# Patient Record
Sex: FEMALE | HISPANIC OR LATINO | URBAN - METROPOLITAN AREA
[De-identification: names, ages, dates, MRNs, and addresses within clinical notes are randomized per-mention and may not be internally consistent; named-entity substitution may affect disease eponyms.]

---

## 2018-11-19 ENCOUNTER — INPATIENT (INPATIENT)
Facility: HOSPITAL | Age: 23
LOS: 8 days | Discharge: HOME | End: 2018-11-28
Attending: PSYCHIATRY & NEUROLOGY | Admitting: PSYCHIATRY & NEUROLOGY
Payer: MEDICAID

## 2018-11-19 VITALS
OXYGEN SATURATION: 100 % | TEMPERATURE: 98 F | RESPIRATION RATE: 18 BRPM | WEIGHT: 149.91 LBS | HEART RATE: 120 BPM | SYSTOLIC BLOOD PRESSURE: 156 MMHG | DIASTOLIC BLOOD PRESSURE: 85 MMHG | HEIGHT: 62 IN

## 2018-11-19 DIAGNOSIS — R45.89 OTHER SYMPTOMS AND SIGNS INVOLVING EMOTIONAL STATE: ICD-10-CM

## 2018-11-19 DIAGNOSIS — F43.23 ADJUSTMENT DISORDER WITH MIXED ANXIETY AND DEPRESSED MOOD: ICD-10-CM

## 2018-11-19 DIAGNOSIS — F32.9 MAJOR DEPRESSIVE DISORDER, SINGLE EPISODE, UNSPECIFIED: ICD-10-CM

## 2018-11-19 LAB
ALBUMIN SERPL ELPH-MCNC: 4.2 G/DL — SIGNIFICANT CHANGE UP (ref 3.3–5)
ALBUMIN SERPL ELPH-MCNC: 4.5 G/DL — SIGNIFICANT CHANGE UP (ref 3.3–5)
ALP SERPL-CCNC: 68 U/L — SIGNIFICANT CHANGE UP (ref 40–120)
ALP SERPL-CCNC: 69 U/L — SIGNIFICANT CHANGE UP (ref 40–120)
ALT FLD-CCNC: 50 U/L — SIGNIFICANT CHANGE UP (ref 12–78)
ALT FLD-CCNC: 52 U/L — SIGNIFICANT CHANGE UP (ref 12–78)
AMPHET UR-MCNC: NEGATIVE — SIGNIFICANT CHANGE UP
ANION GAP SERPL CALC-SCNC: 10 MMOL/L — SIGNIFICANT CHANGE UP (ref 5–17)
ANION GAP SERPL CALC-SCNC: 9 MMOL/L — SIGNIFICANT CHANGE UP (ref 5–17)
APAP SERPL-MCNC: < 2 UG/ML — SIGNIFICANT CHANGE UP (ref 10–30)
APPEARANCE UR: CLEAR — SIGNIFICANT CHANGE UP
AST SERPL-CCNC: 30 U/L — SIGNIFICANT CHANGE UP (ref 15–37)
AST SERPL-CCNC: 57 U/L — HIGH (ref 15–37)
BACTERIA # UR AUTO: ABNORMAL
BARBITURATES UR SCN-MCNC: NEGATIVE — SIGNIFICANT CHANGE UP
BASOPHILS # BLD AUTO: 0.02 K/UL — SIGNIFICANT CHANGE UP (ref 0–0.2)
BASOPHILS NFR BLD AUTO: 0.3 % — SIGNIFICANT CHANGE UP (ref 0–2)
BENZODIAZ UR-MCNC: NEGATIVE — SIGNIFICANT CHANGE UP
BILIRUB DIRECT SERPL-MCNC: <0.1 MG/DL — SIGNIFICANT CHANGE UP (ref 0–0.2)
BILIRUB INDIRECT FLD-MCNC: >0.2 MG/DL — SIGNIFICANT CHANGE UP (ref 0.2–1)
BILIRUB SERPL-MCNC: 0.2 MG/DL — SIGNIFICANT CHANGE UP (ref 0.2–1.2)
BILIRUB SERPL-MCNC: 0.3 MG/DL — SIGNIFICANT CHANGE UP (ref 0.2–1.2)
BILIRUB UR-MCNC: NEGATIVE — SIGNIFICANT CHANGE UP
BUN SERPL-MCNC: 11 MG/DL — SIGNIFICANT CHANGE UP (ref 7–23)
BUN SERPL-MCNC: 12 MG/DL — SIGNIFICANT CHANGE UP (ref 7–23)
CALCIUM SERPL-MCNC: 8.8 MG/DL — SIGNIFICANT CHANGE UP (ref 8.5–10.1)
CALCIUM SERPL-MCNC: 9.1 MG/DL — SIGNIFICANT CHANGE UP (ref 8.5–10.1)
CHLORIDE SERPL-SCNC: 108 MMOL/L — SIGNIFICANT CHANGE UP (ref 96–108)
CHLORIDE SERPL-SCNC: 110 MMOL/L — HIGH (ref 96–108)
CHOLEST SERPL-MCNC: 183 MG/DL — SIGNIFICANT CHANGE UP (ref 10–199)
CO2 SERPL-SCNC: 23 MMOL/L — SIGNIFICANT CHANGE UP (ref 22–31)
CO2 SERPL-SCNC: 24 MMOL/L — SIGNIFICANT CHANGE UP (ref 22–31)
COCAINE METAB.OTHER UR-MCNC: NEGATIVE — SIGNIFICANT CHANGE UP
COLOR SPEC: YELLOW — SIGNIFICANT CHANGE UP
COMMENT - URINE: SIGNIFICANT CHANGE UP
CREAT SERPL-MCNC: 0.81 MG/DL — SIGNIFICANT CHANGE UP (ref 0.5–1.3)
CREAT SERPL-MCNC: 0.84 MG/DL — SIGNIFICANT CHANGE UP (ref 0.5–1.3)
DIFF PNL FLD: NEGATIVE — SIGNIFICANT CHANGE UP
EOSINOPHIL # BLD AUTO: 0.15 K/UL — SIGNIFICANT CHANGE UP (ref 0–0.5)
EOSINOPHIL NFR BLD AUTO: 2.1 % — SIGNIFICANT CHANGE UP (ref 0–6)
EPI CELLS # UR: SIGNIFICANT CHANGE UP
ETHANOL SERPL-MCNC: 278 MG/DL — HIGH (ref 0–10)
GLUCOSE SERPL-MCNC: 112 MG/DL — HIGH (ref 70–99)
GLUCOSE SERPL-MCNC: 92 MG/DL — SIGNIFICANT CHANGE UP (ref 70–99)
GLUCOSE UR QL: NEGATIVE MG/DL — SIGNIFICANT CHANGE UP
HBA1C BLD-MCNC: 5.7 % — HIGH (ref 4–5.6)
HCT VFR BLD CALC: 43.4 % — SIGNIFICANT CHANGE UP (ref 34.5–45)
HDLC SERPL-MCNC: 54 MG/DL — SIGNIFICANT CHANGE UP
HGB BLD-MCNC: 14.3 G/DL — SIGNIFICANT CHANGE UP (ref 11.5–15.5)
IMM GRANULOCYTES NFR BLD AUTO: 0.3 % — SIGNIFICANT CHANGE UP (ref 0–1.5)
KETONES UR-MCNC: NEGATIVE — SIGNIFICANT CHANGE UP
LEUKOCYTE ESTERASE UR-ACNC: NEGATIVE — SIGNIFICANT CHANGE UP
LIPID PNL WITH DIRECT LDL SERPL: 101 MG/DL — SIGNIFICANT CHANGE UP
LYMPHOCYTES # BLD AUTO: 3.27 K/UL — SIGNIFICANT CHANGE UP (ref 1–3.3)
LYMPHOCYTES # BLD AUTO: 46.7 % — HIGH (ref 13–44)
MAGNESIUM SERPL-MCNC: 2.2 MG/DL — SIGNIFICANT CHANGE UP (ref 1.6–2.6)
MCHC RBC-ENTMCNC: 29.9 PG — SIGNIFICANT CHANGE UP (ref 27–34)
MCHC RBC-ENTMCNC: 32.9 GM/DL — SIGNIFICANT CHANGE UP (ref 32–36)
MCV RBC AUTO: 90.6 FL — SIGNIFICANT CHANGE UP (ref 80–100)
METHADONE UR-MCNC: NEGATIVE — SIGNIFICANT CHANGE UP
MONOCYTES # BLD AUTO: 0.39 K/UL — SIGNIFICANT CHANGE UP (ref 0–0.9)
MONOCYTES NFR BLD AUTO: 5.6 % — SIGNIFICANT CHANGE UP (ref 2–14)
NEUTROPHILS # BLD AUTO: 3.15 K/UL — SIGNIFICANT CHANGE UP (ref 1.8–7.4)
NEUTROPHILS NFR BLD AUTO: 45 % — SIGNIFICANT CHANGE UP (ref 43–77)
NITRITE UR-MCNC: NEGATIVE — SIGNIFICANT CHANGE UP
NRBC # BLD: 0 /100 WBCS — SIGNIFICANT CHANGE UP (ref 0–0)
OPIATES UR-MCNC: NEGATIVE — SIGNIFICANT CHANGE UP
PCP SPEC-MCNC: SIGNIFICANT CHANGE UP
PCP UR-MCNC: NEGATIVE — SIGNIFICANT CHANGE UP
PH UR: 6 — SIGNIFICANT CHANGE UP (ref 5–8)
PHOSPHATE SERPL-MCNC: 3.1 MG/DL — SIGNIFICANT CHANGE UP (ref 2.5–4.5)
PLATELET # BLD AUTO: 360 K/UL — SIGNIFICANT CHANGE UP (ref 150–400)
POTASSIUM SERPL-MCNC: 3.9 MMOL/L — SIGNIFICANT CHANGE UP (ref 3.5–5.3)
POTASSIUM SERPL-MCNC: 4.8 MMOL/L — SIGNIFICANT CHANGE UP (ref 3.5–5.3)
POTASSIUM SERPL-SCNC: 3.9 MMOL/L — SIGNIFICANT CHANGE UP (ref 3.5–5.3)
POTASSIUM SERPL-SCNC: 4.8 MMOL/L — SIGNIFICANT CHANGE UP (ref 3.5–5.3)
PROT SERPL-MCNC: 8.6 GM/DL — HIGH (ref 6–8.3)
PROT SERPL-MCNC: 8.8 GM/DL — HIGH (ref 6–8.3)
PROT UR-MCNC: 30 MG/DL
RBC # BLD: 4.79 M/UL — SIGNIFICANT CHANGE UP (ref 3.8–5.2)
RBC # FLD: 13.1 % — SIGNIFICANT CHANGE UP (ref 10.3–14.5)
RBC CASTS # UR COMP ASSIST: NEGATIVE /HPF — SIGNIFICANT CHANGE UP (ref 0–4)
SALICYLATES SERPL-MCNC: <1.7 MG/DL — LOW (ref 2.8–20)
SODIUM SERPL-SCNC: 140 MMOL/L — SIGNIFICANT CHANGE UP (ref 135–145)
SODIUM SERPL-SCNC: 144 MMOL/L — SIGNIFICANT CHANGE UP (ref 135–145)
SP GR SPEC: 1 — LOW (ref 1.01–1.02)
THC UR QL: NEGATIVE — SIGNIFICANT CHANGE UP
TOTAL CHOLESTEROL/HDL RATIO MEASUREMENT: 3.4 RATIO — SIGNIFICANT CHANGE UP (ref 3.3–7.1)
TRIGL SERPL-MCNC: 139 MG/DL — SIGNIFICANT CHANGE UP (ref 10–149)
TSH SERPL-MCNC: 1.04 UU/ML — SIGNIFICANT CHANGE UP (ref 0.34–4.82)
UROBILINOGEN FLD QL: NEGATIVE MG/DL — SIGNIFICANT CHANGE UP
WBC # BLD: 7 K/UL — SIGNIFICANT CHANGE UP (ref 3.8–10.5)
WBC # FLD AUTO: 7 K/UL — SIGNIFICANT CHANGE UP (ref 3.8–10.5)
WBC UR QL: NEGATIVE — SIGNIFICANT CHANGE UP

## 2018-11-19 PROCEDURE — 99285 EMERGENCY DEPT VISIT HI MDM: CPT

## 2018-11-19 PROCEDURE — 90792 PSYCH DIAG EVAL W/MED SRVCS: CPT

## 2018-11-19 PROCEDURE — 93010 ELECTROCARDIOGRAM REPORT: CPT

## 2018-11-19 RX ORDER — HALOPERIDOL DECANOATE 100 MG/ML
5 INJECTION INTRAMUSCULAR ONCE
Qty: 0 | Refills: 0 | Status: COMPLETED | OUTPATIENT
Start: 2018-11-19 | End: 2018-11-19

## 2018-11-19 RX ORDER — DIPHENHYDRAMINE HCL 50 MG
50 CAPSULE ORAL ONCE
Qty: 0 | Refills: 0 | Status: COMPLETED | OUTPATIENT
Start: 2018-11-19 | End: 2018-11-19

## 2018-11-19 RX ORDER — HALOPERIDOL DECANOATE 100 MG/ML
5 INJECTION INTRAMUSCULAR EVERY 6 HOURS
Qty: 0 | Refills: 0 | Status: DISCONTINUED | OUTPATIENT
Start: 2018-11-19 | End: 2018-11-28

## 2018-11-19 RX ORDER — ESCITALOPRAM OXALATE 10 MG/1
5 TABLET, FILM COATED ORAL DAILY
Qty: 0 | Refills: 0 | Status: DISCONTINUED | OUTPATIENT
Start: 2018-11-19 | End: 2018-11-20

## 2018-11-19 RX ORDER — DIPHENHYDRAMINE HCL 50 MG
50 CAPSULE ORAL EVERY 6 HOURS
Qty: 0 | Refills: 0 | Status: DISCONTINUED | OUTPATIENT
Start: 2018-11-19 | End: 2018-11-28

## 2018-11-19 RX ADMIN — Medication 50 MILLIGRAM(S): at 01:55

## 2018-11-19 RX ADMIN — Medication 2 MILLIGRAM(S): at 01:55

## 2018-11-19 RX ADMIN — HALOPERIDOL DECANOATE 5 MILLIGRAM(S): 100 INJECTION INTRAMUSCULAR at 01:55

## 2018-11-19 NOTE — H&P ADULT - NSHPPHYSICALEXAM_GEN_ALL_CORE
ICU Vital Signs Last 24 Hrs    T(F): 98.4 (19 Nov 2018 20:41), Max: 98.9 (19 Nov 2018 15:38)  HR: 117 (19 Nov 2018 20:41) (87 - 120)  BP: 103/65 (19 Nov 2018 20:41) (103/65 - 156/85)    RR: 16 (19 Nov 2018 20:41) (16 - 18)  SpO2: 100% (19 Nov 2018 20:41) (99% - 100%)

## 2018-11-19 NOTE — ED ADULT TRIAGE NOTE - CHIEF COMPLAINT QUOTE
As per EMS, patient was found on the side on the side of the [facility] threatening to run in front of traffic. EMS states she drank 2 beers but patient denies. Arrived in handcuffs by [facility] Police #[id number] , EMS states patient became agitated and was handcuffed to stretcher by PD. Patient cooperative on arrival to ED, stating EMS personnel are her "boyfriends", and that triage RN is "very cute" No distress noted at this time. When attempting to place patient in hospital stretcher, patient became extremely agitated, yelling and aggressive with EMS and ED staff. Multiple RNs and MD Osei called to bedside.

## 2018-11-19 NOTE — ED ADULT NURSE NOTE - OBJECTIVE STATEMENT
· HPI Objective Statement: Pt. is a 24 yo F with unknown medical history BIB police from [facility] where she was found trying to run into the road being restrained by a male friend who states he was the designated  to bring her home.  Pt. told police that male was her boyfriend but male at scene denied being in a relationship with the patient.  Police witnessed patient say "my family my family, I am going to kill myself."  Police called EMS who arrived but patient was combative and refused to get into the ambulance.  On arrival, pt. admits to drinking 2 beers last night and denies any other drug use, but cannot give a history of why she was on the highway, who she was with, or anything about the events that led her to be brought to the hospital.  Pt. handcuffed on arrival, but aggressive and trying to get out of handcuffs and yelling at police calling them liars.  She had inappropriate laughing at times.  Pt. currently denies trying to run into the road and when questioned about why she was on the highway in the middle of the night she was laughing and had no answers.

## 2018-11-19 NOTE — PATIENT PROFILE BEHAVIORAL HEALTH - FUNCTIONAL LEVEL PRIOR: TOILETING
Psychotherapy Provided: Individual Psychotherapy 45 minutes     Length of time in session: 45 minutes, follow up in 1 month    Goals addressed in session: Goal 1     Pain:      none    0    Current suicide risk : Low     D- Linda presented as upset and tearful  She stated that she has been struggling to decide whether or not to have her boyfriend permanently move in  She stated that she worries about her family's opinions despite their treatment of her  She also stated that she worries about his ex-wife's behavior as well as having a role with his children  She also shared that she has made many mistakes in her life and is worried about doing so again  Processing her emotions and discussing ways for her to be endy to make a decision regarding her relationship  Also discussing continuing to set boundaries in her relationship with her family  Reviewing and signing her treatment plan  Giving supportive therapy  A- Progress - Continuing to process her emotions  P-Continue treatment    [address]: Diagnosis and Treatment Plan explained to Chayo Franklin relates understanding diagnosis and is agreeable to Treatment Plan   Yes 0 = independent

## 2018-11-19 NOTE — ED BEHAVIORAL HEALTH ASSESSMENT NOTE - RISK ASSESSMENT
Pt is s/p SA , at risk to harm self. She is impulsive, irritable, using EtOH, por coping skills, SI. impulsive. She is future oriented, but we have no collaterals, and pt is not cooperating.

## 2018-11-19 NOTE — ED PROVIDER NOTE - PSYCHIATRIC, MLM
Alert and oriented to person and type of place. crying, laughing, appears intoxicated. Unable to cooperate for exam.

## 2018-11-19 NOTE — H&P ADULT - HISTORY OF PRESENT ILLNESS
Pt. is a 22 yo F with unknown medical history BIB police from [facility] where she was found trying to run into the road being restrained by a male friend who states he was the designated  to bring her home.  Pt. told police that male was her boyfriend but male at scene denied being in a relationship with the patient.  Police witnessed patient say "my family my family, I am going to kill myself."  Police called EMS who arrived but patient was combative and refused to get into the ambulance.  On arrival, pt. admits to drinking 2 beers last night and denies any other drug use, but cannot give a history of why she was on the highway, who she was with, or anything about the events that led her to be brought to the hospital.  Pt. handcuffed on arrival, but aggressive and trying to get out of handcuffs and yelling at police calling them liars.  She had inappropriate laughing at times.  Pt. currently denies trying to run into the road and when questioned about why she was on the highway in the middle of the night she was laughing and had no answers Pt is a 22 yo F with PMed/Surg hx appendectomy who presented to the ED with police from [facility] as she was trying to run into the road and being restrained by a male friend who stated he was the designated  to bring her home.  Pt. told police that the male was her boyfriend but the male denied being in a relationship with the patient.  Police reported  patient said "my family my family, I am going to kill myself."   EMS was unable to bring pt as she  was combative and refused to get into the ambulance.  She was brought  handcuffed on arrival, but continued to be combative and was trying to get out of the handcuffs .       In the ED, pt. reported drinking 2 beers last night and  couldn't give a history of the recent events on  the highway.  Pt. denied trying to run into the road and when questioned about why she was on the highway in the middle of the night, she was laughing and would not reply.    Past Med/Surg Hx:  appendectomy    Fam Hx:  Denies known medical problems  Mother alive at 52  Father alive at 45

## 2018-11-19 NOTE — H&P ADULT - ASSESSMENT
Pt is admitted w/    I. Depression, Suicidal Ideation  - cont inpatient behavioral health treatment plan    II. Alcohol abuse  - drinking cessation    III. DVT prophylaxis  - ambulation    IV. IMPROVE VTE Individual Risk Assessment    RISK                                                                Points    [  ] Previous VTE                                                  3    [  ] Thrombophilia                                               2    [  ] Lower limb paralysis                                      2        (unable to hold up >15 seconds)      [  ] Current Cancer                                              2         (within 6 months)    [  ] Immobilization > 24 hrs                                1    [  ] ICU/CCU stay > 24 hours                              1    [  ] Age > 60                                                      1    IMPROVE VTE Score ___0______ Pt is a 22 yo F with PMed/Surg hx appendectomy who presented to the ED with police from [facility] as she was trying to run into the road and being restrained by a male friend who stated he was the designated  to bring her home.  Pt. told police that the male was her boyfriend but the male denied being in a relationship with the patient.  Police reported  patient said "my family my family, I am going to kill myself."   EMS was unable to bring pt as she  was combative and refused to get into the ambulance.  She was brought  handcuffed on arrival, but continued to be combative and was trying to get out of the handcuffs .       In the ED, pt. reported drinking 2 beers last night and  coundn't give a history of the recent events on  the highway.  Pt. denied trying to run into the road and when questioned about why she was on the highway in the middle of the night, she was laughing and would not reply.    Pt is admitted w/    I. Depression, Suicidal Ideation  - cont inpatient behavioral health treatment plan    II. Alcohol abuse  - drinking cessation    III. DVT prophylaxis  - ambulation    IV. IMPROVE VTE Individual Risk Assessment    RISK                                                                Points    [  ] Previous VTE                                                  3    [  ] Thrombophilia                                               2    [  ] Lower limb paralysis                                      2        (unable to hold up >15 seconds)      [  ] Current Cancer                                              2         (within 6 months)    [  ] Immobilization > 24 hrs                                1    [  ] ICU/CCU stay > 24 hours                              1    [  ] Age > 60                                                      1    IMPROVE VTE Score ___0______

## 2018-11-19 NOTE — ED PROVIDER NOTE - OBJECTIVE STATEMENT
Pt. is a 22 yo F with unknown medical history BIB police from [facility] where she was found trying to run into the road being restrained by a male friend who states he was the designated  to bring her home.  Pt. told police that male was her boyfriend but male at scene denied being in a relationship with the patient.  Police witnessed patient say "my family my family, I am going to kill myself."  Police called EMS who arrived but patient was combative and refused to get into the ambulance.  On arrival, pt. admits to drinking 2 beers last night and denies any other drug use, but cannot give a history of why she was on the highway, who she was with, or anything about the events that led her to be brought to the hospital.  Pt. handcuffed on arrival, but aggressive and trying to get out of handcuffs and yelling at police calling them liars.  She had inappropriate laughing at times.  Pt. currently denies trying to run into the road and when questioned about why she was on the highway in the middle of the night she was laughing and had no answers.

## 2018-11-19 NOTE — ED PROVIDER NOTE - CONSTITUTIONAL, MLM
normal... Well appearing, well nourished, awake, alert, oriented to person and type of place only.  Crying and inappropriately laughing but poor historian. Appears intoxicated.

## 2018-11-19 NOTE — ED ADULT NURSE NOTE - NSIMPLEMENTINTERV_GEN_ALL_ED
Implemented All Fall Risk Interventions:  [place] to call system. Call bell, personal items and telephone within reach. Instruct patient to call for assistance. Room bathroom lighting operational. Non-slip footwear when patient is off stretcher. Physically safe environment: no spills, clutter or unnecessary equipment. Stretcher in lowest position, wheels locked, appropriate side rails in place. Provide visual cue, wrist band, yellow gown, etc. Monitor gait and stability. Monitor for mental status changes and reorient to person, place, and time. Review medications for side effects contributing to fall risk. Reinforce activity limits and safety measures with patient and family.

## 2018-11-19 NOTE — ED BEHAVIORAL HEALTH ASSESSMENT NOTE - HPI (INCLUDE ILLNESS QUALITY, SEVERITY, DURATION, TIMING, CONTEXT, MODIFYING FACTORS, ASSOCIATED SIGNS AND SYMPTOMS)
23YOHW with no known psychiatric hx, presents in ER after she attempted suicide by walking into the traffic on [facility].   reportedly, pt was help by male partner in order not to ru into the traffic when police arrived. PT stated to police that she wanted to kilo herself and today she is repeating the same.   She state states that she wanted to kill herself, but when asked to explain the stressors and what happened, she states that she palmer not remember. On admission EtOH level was 278.   Today pt states that she does not feel suicidal. However, she doest not want us to contact any collaterals and she states that she palmer snot remember what happened. She endorses depressed mood and sleep [problems, Denies AH and VH.   She continue to be at high imminent risk to suicide and she need inpatient level of care,.

## 2018-11-19 NOTE — H&P ADULT - NSHPSOCIALHISTORY_GEN_ALL_CORE
No tob/ETOH/drug use. Pt reports she lives with a friend.  She repors 3 cig on the weekend and 6-8 beers on the weekend.   Denies drug use.   She grew up in [place] and her parents live in [place].

## 2018-11-19 NOTE — ED ADULT NURSE NOTE - CHIEF COMPLAINT QUOTE
As per EMS, patient was found on the side on the side of the [facility] threatening to run in front of traffic. EMS states she drank 2 beers but patient denies. Arrived in handcuffs by [facility] Police #[id number] , EMS states patient became agitated and was handcuffed to stretcher by PD. Patient cooperative on arrival to ED, stating EMS personnel are her "boyfriends", and that triage RN is "very cute" No distress noted at this time. When attempting to place patient in hospital stretcher, patient became extremely agitated, yelling and aggressive with EMS and ED staff. Multiple RNs and MD Osei called to bedside.

## 2018-11-19 NOTE — ED PROVIDER NOTE - MEDICAL DECISION MAKING DETAILS
Alcohol intoxication with state  who witnessed suicidal statement and attempts to run into the road and friends holding her back. Will get labs, urine, and will have psych eval in AM.

## 2018-11-19 NOTE — ED BEHAVIORAL HEALTH ASSESSMENT NOTE - SUMMARY
23 YOHW with no formal psych hx, presents in ER s/op suicide attempt. She is at imminent risk to harm self and she needs inpatient level of care.   Admit 9.39 to 5N.

## 2018-11-20 DIAGNOSIS — F10.929 ALCOHOL USE, UNSPECIFIED WITH INTOXICATION, UNSPECIFIED: ICD-10-CM

## 2018-11-20 LAB
ANION GAP SERPL CALC-SCNC: 8 MMOL/L — SIGNIFICANT CHANGE UP (ref 5–17)
BUN SERPL-MCNC: 15 MG/DL — SIGNIFICANT CHANGE UP (ref 7–23)
CALCIUM SERPL-MCNC: 9.2 MG/DL — SIGNIFICANT CHANGE UP (ref 8.5–10.1)
CHLORIDE SERPL-SCNC: 104 MMOL/L — SIGNIFICANT CHANGE UP (ref 96–108)
CO2 SERPL-SCNC: 29 MMOL/L — SIGNIFICANT CHANGE UP (ref 22–31)
CREAT SERPL-MCNC: 0.86 MG/DL — SIGNIFICANT CHANGE UP (ref 0.5–1.3)
GLUCOSE SERPL-MCNC: 89 MG/DL — SIGNIFICANT CHANGE UP (ref 70–99)
MAGNESIUM SERPL-MCNC: 2.2 MG/DL — SIGNIFICANT CHANGE UP (ref 1.6–2.6)
PHOSPHATE SERPL-MCNC: 3.4 MG/DL — SIGNIFICANT CHANGE UP (ref 2.5–4.5)
POTASSIUM SERPL-MCNC: 3.8 MMOL/L — SIGNIFICANT CHANGE UP (ref 3.5–5.3)
POTASSIUM SERPL-SCNC: 3.8 MMOL/L — SIGNIFICANT CHANGE UP (ref 3.5–5.3)
SODIUM SERPL-SCNC: 141 MMOL/L — SIGNIFICANT CHANGE UP (ref 135–145)

## 2018-11-20 PROCEDURE — 99232 SBSQ HOSP IP/OBS MODERATE 35: CPT

## 2018-11-20 PROCEDURE — 93010 ELECTROCARDIOGRAM REPORT: CPT

## 2018-11-20 RX ORDER — ESCITALOPRAM OXALATE 10 MG/1
10 TABLET, FILM COATED ORAL DAILY
Qty: 0 | Refills: 0 | Status: DISCONTINUED | OUTPATIENT
Start: 2018-11-20 | End: 2018-11-28

## 2018-11-20 RX ORDER — ESCITALOPRAM OXALATE 10 MG/1
5 TABLET, FILM COATED ORAL DAILY
Qty: 0 | Refills: 0 | Status: DISCONTINUED | OUTPATIENT
Start: 2018-11-20 | End: 2018-11-20

## 2018-11-21 PROCEDURE — 99232 SBSQ HOSP IP/OBS MODERATE 35: CPT

## 2018-11-21 RX ADMIN — ESCITALOPRAM OXALATE 10 MILLIGRAM(S): 10 TABLET, FILM COATED ORAL at 09:08

## 2018-11-22 PROCEDURE — 99232 SBSQ HOSP IP/OBS MODERATE 35: CPT

## 2018-11-22 RX ORDER — TRAZODONE HCL 50 MG
50 TABLET ORAL AT BEDTIME
Qty: 0 | Refills: 0 | Status: DISCONTINUED | OUTPATIENT
Start: 2018-11-22 | End: 2018-11-28

## 2018-11-22 RX ADMIN — ESCITALOPRAM OXALATE 10 MILLIGRAM(S): 10 TABLET, FILM COATED ORAL at 10:15

## 2018-11-22 RX ADMIN — Medication 50 MILLIGRAM(S): at 20:46

## 2018-11-23 PROCEDURE — 99232 SBSQ HOSP IP/OBS MODERATE 35: CPT

## 2018-11-23 RX ADMIN — ESCITALOPRAM OXALATE 10 MILLIGRAM(S): 10 TABLET, FILM COATED ORAL at 09:35

## 2018-11-23 RX ADMIN — Medication 50 MILLIGRAM(S): at 21:52

## 2018-11-24 RX ADMIN — Medication 50 MILLIGRAM(S): at 21:13

## 2018-11-24 RX ADMIN — ESCITALOPRAM OXALATE 10 MILLIGRAM(S): 10 TABLET, FILM COATED ORAL at 09:24

## 2018-11-24 NOTE — PROGRESS NOTE BEHAVIORAL HEALTH - NSBHADMITMEDEDUDETAILS_A_CORE FT
pt is aware of the use of medication and of potential side effects

## 2018-11-24 NOTE — PROGRESS NOTE BEHAVIORAL HEALTH - PROBLEM SELECTOR PROBLEM 4
Alcoholic intoxication with complication

## 2018-11-24 NOTE — PROGRESS NOTE BEHAVIORAL HEALTH - PROBLEM SELECTOR PLAN 3
pt to attend groups for insight and coping skills

## 2018-11-24 NOTE — PROGRESS NOTE BEHAVIORAL HEALTH - PROBLEM SELECTOR PLAN 4
AA, therapy to address maladaptive coping/alcohol use  D/c vaibhav substance abuse tx
AA, therapy to address maladaptive coping/alcohol use  D/c vaibhav substance abuse tx
AA, therapy to address maladaptive coping/alcohol use
AA, therapy to address maladaptive coping/alcohol use  D/c vaibhav substance abuse tx
AA, therapy to address maladaptive coping/alcohol use  D/c vaibhav substance abuse tx

## 2018-11-24 NOTE — PROGRESS NOTE BEHAVIORAL HEALTH - NSBHCHARTREVIEWINVESTIGATE_PSY_A_CORE FT
Ventricular Rate 81 BPM    Atrial Rate 81 BPM    P-R Interval 152 ms    QRS Duration 84 ms    Q-T Interval 372 ms    QTC Calculation(Bezet) 432 ms    P Axis 40 degrees    R Axis 75 degrees    T Axis 33 degrees    Diagnosis Line Normal sinus rhythm  Normal ECG  When compared with ECG of 19-NOV-2018 01:58,  QT has shortened  Confirmed by Edmund Seals ([id number])
completed but not entered in EMR  Pregnancy done but not seen in EMR
Ventricular Rate 81 BPM    Atrial Rate 81 BPM    P-R Interval 152 ms    QRS Duration 84 ms    Q-T Interval 372 ms    QTC Calculation(Bezet) 432 ms    P Axis 40 degrees    R Axis 75 degrees    T Axis 33 degrees    Diagnosis Line Normal sinus rhythm  Normal ECG  When compared with ECG of 19-NOV-2018 01:58,  QT has shortened  Confirmed by Edmund Seals ([id number])
QRS axis to [] ° and NSR at a rate of [] BPM. There was no atrial enlargement. There was no ventricular hypertrophy. There were no ST-T changes and all intervals were normal.

## 2018-11-24 NOTE — PROGRESS NOTE BEHAVIORAL HEALTH - SECONDARY DX1
Adjustment disorder with mixed anxiety and depressed mood
Adjustment disorder with mixed anxiety and depressed mood
Suicidal risk

## 2018-11-24 NOTE — PROGRESS NOTE BEHAVIORAL HEALTH - PROBLEM SELECTOR PROBLEM 2
Adjustment disorder with mixed anxiety and depressed mood

## 2018-11-24 NOTE — PROGRESS NOTE BEHAVIORAL HEALTH - SECONDARY DX2
Depressive disorder
Depressive disorder
Alcoholic intoxication with complication

## 2018-11-24 NOTE — PROGRESS NOTE BEHAVIORAL HEALTH - PROBLEM SELECTOR PLAN 2
lexapro for mood changes

## 2018-11-25 LAB
APPEARANCE UR: CLEAR — SIGNIFICANT CHANGE UP
BILIRUB UR-MCNC: NEGATIVE — SIGNIFICANT CHANGE UP
COLOR SPEC: YELLOW — SIGNIFICANT CHANGE UP
DIFF PNL FLD: NEGATIVE — SIGNIFICANT CHANGE UP
GLUCOSE UR QL: NEGATIVE MG/DL — SIGNIFICANT CHANGE UP
KETONES UR-MCNC: NEGATIVE — SIGNIFICANT CHANGE UP
LEUKOCYTE ESTERASE UR-ACNC: NEGATIVE — SIGNIFICANT CHANGE UP
NITRITE UR-MCNC: NEGATIVE — SIGNIFICANT CHANGE UP
PH UR: 7 — SIGNIFICANT CHANGE UP (ref 5–8)
PROT UR-MCNC: NEGATIVE MG/DL — SIGNIFICANT CHANGE UP
SP GR SPEC: 1 — LOW (ref 1.01–1.02)
UROBILINOGEN FLD QL: NEGATIVE MG/DL — SIGNIFICANT CHANGE UP

## 2018-11-25 RX ADMIN — Medication 50 MILLIGRAM(S): at 21:35

## 2018-11-25 RX ADMIN — ESCITALOPRAM OXALATE 10 MILLIGRAM(S): 10 TABLET, FILM COATED ORAL at 09:11

## 2018-11-26 PROCEDURE — 99231 SBSQ HOSP IP/OBS SF/LOW 25: CPT

## 2018-11-26 RX ORDER — ESCITALOPRAM OXALATE 10 MG/1
1 TABLET, FILM COATED ORAL
Qty: 15 | Refills: 0 | OUTPATIENT
Start: 2018-11-26 | End: 2018-12-10

## 2018-11-26 RX ORDER — TRAZODONE HCL 50 MG
1 TABLET ORAL
Qty: 15 | Refills: 0 | OUTPATIENT
Start: 2018-11-26 | End: 2018-12-10

## 2018-11-26 RX ADMIN — ESCITALOPRAM OXALATE 10 MILLIGRAM(S): 10 TABLET, FILM COATED ORAL at 09:45

## 2018-11-26 RX ADMIN — Medication 50 MILLIGRAM(S): at 21:19

## 2018-11-26 NOTE — DISCHARGE NOTE BEHAVIORAL HEALTH - NSBHDCSUICFCTRSFT_PSY_A_CORE
Alcohol use; communication with parents; initiation and compliance with treatment / follow-up; engaging employer to secure medical care financial support / access to healthcare

## 2018-11-26 NOTE — DISCHARGE NOTE BEHAVIORAL HEALTH - NSBHDCSUICPROTECTFT_PSY_A_CORE
no suicidal ideation/intent/plan, no homicidal ideation/intent/plan, no actual suicide attempt; positive response to medication management; improved insight and judgment; motivated for out-patient treatment; medication compliance; future oriented; engaged in safety planning

## 2018-11-26 NOTE — DISCHARGE NOTE BEHAVIORAL HEALTH - CONDITIONS AT DISCHARGE
Patient alert, oriented x3, pleasant and cooperative.  Pt denies S/H IIP currently.  Nurse and  reviewed discharge plan with patient who agrees and accepts plan.  Pt provided with a copy of discharge paperwork.  Pt appropriately dressed for discharge and is transported home by family to ensure safe arrival home. Patient alert, oriented x3, pleasant and cooperative.  Pt denies S/H IIP currently.  Nurse and  reviewed discharge plan with patient who agrees and accepts plan.  Pt provided with a copy of discharge paperwork.  Pt appropriately dressed for discharge and is transported home by family to ensure safe arrival home. Seven day supply of prescribed medications to be provided by  Pharmacy, additional RX sent to patient requested pharmacy; [facility] ([phone] located at [address] [place] [address]).

## 2018-11-26 NOTE — DISCHARGE NOTE BEHAVIORAL HEALTH - MEDICATION SUMMARY - MEDICATIONS TO TAKE
I will START or STAY ON the medications listed below when I get home from the hospital:    Lexapro 10 mg oral tablet  -- 1 tab(s) by mouth once a day  -- Indication: For Depressive disorder    traZODone 50 mg oral tablet  -- 1 tab(s) by mouth once a day (at bedtime)  -- Indication: For Depressive disorder

## 2018-11-26 NOTE — DISCHARGE NOTE BEHAVIORAL HEALTH - HPI (INCLUDE ILLNESS QUALITY, SEVERITY, DURATION, TIMING, CONTEXT, MODIFYING FACTORS, ASSOCIATED SIGNS AND SYMPTOMS)
23YOHW with no known psychiatric history, presented in ER after she attempted suicide by walking into the traffic on [address].     Reportedly, patient was helped by male partner in order not to run into the traffic when police arrived. Patient stated to police that she wanted to kill herself and repeated the same during ED course.  She state states that she wanted to kill herself, but when asked to explain the stressors and what happened, she states that she palmer not remember. On admission EtOH level was 278.   Later after sobriety, patient retracted suicidal ideation, however given refusal of providing collateral, along with depressive symptoms with high risk suicidal behaviors, patient was an imminent risk and was admitted psychiatrically.

## 2018-11-26 NOTE — DISCHARGE NOTE BEHAVIORAL HEALTH - NSBHDCSUICSAFETYFT_PSY_A_CORE
Patient instructed to return to the ED or call 911 if patient experiences SI, HI, hopelessness, worsening of symptoms or has any other concerns. Patient instructed to return to the ED or call 911 if patient experiences SI, HI, hopelessness, worsening of symptoms or has any other concerns.  Patient encouraged to reach out to family or employer to report any worsening symptoms.  Pt to use coping skills she has learned to deal with symptoms. Advised to return to hospital or go to nearest ED or call 911 or (755) LIFENET or [phone] 273 TALK hotlines for any severe, worsening or persistent symptoms including suicidal/homicidal ideations, intent or plans. Patient verbalized understanding of instructions.

## 2018-11-26 NOTE — DISCHARGE NOTE BEHAVIORAL HEALTH - FAMILY HISTORY OF PSYCHIATRIC ILLNESS
Patient is an immigrant; works and lives in house where she is employed as [employer]; parents live in [place] of which they have had strained relationship however improved since in-patient hospitalization; patient motivated to continue her education and learn English.

## 2018-11-26 NOTE — DISCHARGE NOTE BEHAVIORAL HEALTH - NSBHDCMEDSFT_PSY_A_CORE
Lexapro 10 mg daily - patient saw improvement of depressive symptoms, with less persistent sadness, no hopelessness, no helplessness, improved energy and motivation and no suicidal ideation/intent/plan. Patient experiencing improved insight and judgment.    Trazodone 50 mg at bedtime - patient had improved overall mood symptoms, more specifically sleep hygiene. Lexapro 10 mg daily - patient saw improvement of depressive symptoms, with less persistent sadness, no hopelessness, no helplessness, improved energy and motivation and no suicidal ideation/intent/plan. Patient experiencing improved insight and judgment.    Trazodone 50 mg at bedtime - patient had improved overall mood symptoms, more specifically sleep hygiene.    Patient educated to not stop any medication unless otherwise told to do so by outpatient provider

## 2018-11-26 NOTE — DISCHARGE NOTE BEHAVIORAL HEALTH - NSBHDCCONDITIONFT_PSY_A_CORE
Patient alert, oriented x3, pleasant and cooperative.  Pt denies any thoughts to harm self or others

## 2018-11-26 NOTE — DISCHARGE NOTE BEHAVIORAL HEALTH - NSBHDCDXVALIDYESFT_PSY_A_CORE
Patient admitted for depressive disorder, of which influenced suicidal behaviors. Additionally patient aware of alcohol's role in influencing her depressive symptoms. Patient treated with antidepressant, which showed clinical improvement, as patient experiencing a decrease in depressive symptoms.

## 2018-11-26 NOTE — DISCHARGE NOTE BEHAVIORAL HEALTH - NSBHDCRESPONSEFT_PSY_A_CORE
Lexapro 10 mg daily - patient saw improvement of depressive symptoms, with less persistent sadness, no hopelessness, no helplessness, improved energy and motivation and no suicidal ideation/intent/plan. Patient experiencing improved insight and judgment.    Trazodone 50 mg daily saw improved mood symptoms, more specifically sleep hygiene.

## 2018-11-26 NOTE — DISCHARGE NOTE BEHAVIORAL HEALTH - NSBHDCTHERAPYFT_PSY_A_CORE
Patient participated in both group and individual therapy, along with art therapy.  Patient assessed on a daily basis for medication management by psychiatrist / NP.  Patient engaged in discharge and safety planning with psychiatric team  Patient offered family meeting if needed to help with discharge planning.

## 2018-11-26 NOTE — DISCHARGE NOTE BEHAVIORAL HEALTH - NSTOBACCOREFERRAL_GEN_A_CS
Yes Patient is a non smoker/Patient declined information Patient declined information/Patient refused referral at admission and discharge

## 2018-11-26 NOTE — DISCHARGE NOTE BEHAVIORAL HEALTH - NSBHDCCRISISPLAN1FT_PSY_A_CORE
Advised to return to hospital or go to nearest ED or call 911 or (880) LIFENET or [phone]) 349 TALK hotlines for any severe, worsening or persistent symptoms including suicidal/homicidal ideations, intent or plans. Patient verbalized understanding of instructions.

## 2018-11-26 NOTE — DISCHARGE NOTE BEHAVIORAL HEALTH - NSBHDCSUICFCTROTHERFT_PSY_A_CORE
decrease in affordability for healthcare services; immigration / legal matters / risk for deportation

## 2018-11-26 NOTE — DISCHARGE NOTE BEHAVIORAL HEALTH - NSBHDCSUICFCTRMIT_PSY_A_CORE
Patient has access to supportive relationships including family and employer; future oriented; good insight and judgment about alcohol use; motivation for out-patient treatment

## 2018-11-27 PROCEDURE — 99232 SBSQ HOSP IP/OBS MODERATE 35: CPT

## 2018-11-27 RX ADMIN — ESCITALOPRAM OXALATE 10 MILLIGRAM(S): 10 TABLET, FILM COATED ORAL at 09:22

## 2018-11-27 RX ADMIN — Medication 50 MILLIGRAM(S): at 21:28

## 2018-11-27 NOTE — PROGRESS NOTE BEHAVIORAL HEALTH - THOUGHT CONTENT
Unremarkable
Other/Preoccupations
Unremarkable/Preoccupations
Other/Preoccupations
Preoccupations/Unremarkable
Other/Preoccupations
Preoccupations/Unremarkable
Preoccupations/Other

## 2018-11-27 NOTE — PROGRESS NOTE BEHAVIORAL HEALTH - NSBHCHARTREVIEWVS_PSY_A_CORE FT
Vital Signs Last 24 Hrs  T(C): 36.6 (22 Nov 2018 08:33), Max: 36.6 (22 Nov 2018 08:33)  T(F): 97.8 (22 Nov 2018 08:33), Max: 97.8 (22 Nov 2018 08:33)  HR: 76 (22 Nov 2018 08:33) (76 - 76)  BP: 117/72 (22 Nov 2018 08:33) (117/72 - 117/72)  BP(mean): --  RR: 14 (22 Nov 2018 08:33) (14 - 14)  SpO2: 100% (22 Nov 2018 08:33) (100% - 100%)
Vital Signs Last 24 Hrs  T(C): 36.7 (24 Nov 2018 08:56), Max: 36.7 (24 Nov 2018 08:56)  T(F): 98.1 (24 Nov 2018 08:56), Max: 98.1 (24 Nov 2018 08:56)  HR: 76 (24 Nov 2018 08:56) (76 - 76)  BP: 105/66 (24 Nov 2018 08:56) (105/66 - 105/66)  BP(mean): --  RR: 14 (24 Nov 2018 08:56) (14 - 14)  SpO2: 100% (24 Nov 2018 08:56) (100% - 100%)
Vital Signs Last 24 Hrs  T(C): 36.7 (27 Nov 2018 08:02), Max: 36.7 (27 Nov 2018 08:02)  T(F): 98 (27 Nov 2018 08:02), Max: 98 (27 Nov 2018 08:02)  HR: 82 (27 Nov 2018 08:02) (82 - 82)  BP: 109/67 (27 Nov 2018 08:02) (109/67 - 109/67)  BP(mean): --  RR: 16 (27 Nov 2018 08:02) (16 - 16)  SpO2: 99% (27 Nov 2018 08:02) (99% - 99%)
ICU Vital Signs Last 24 Hrs  T(C): 36.3 (20 Nov 2018 09:01), Max: 37.2 (19 Nov 2018 15:38)  T(F): 97.3 (20 Nov 2018 09:01), Max: 98.9 (19 Nov 2018 15:38)  HR: 109 (20 Nov 2018 09:01) (88 - 117)  BP: 120/83 (20 Nov 2018 09:01) (102/65 - 120/83)  BP(mean): --  ABP: --  ABP(mean): --  RR: 16 (20 Nov 2018 09:01) (16 - 16)  SpO2: 100% (20 Nov 2018 09:01) (99% - 100%)
Vital Signs Last 24 Hrs  T(C): 36.6 (26 Nov 2018 07:59), Max: 36.6 (26 Nov 2018 07:59)  T(F): 97.9 (26 Nov 2018 07:59), Max: 97.9 (26 Nov 2018 07:59)  HR: 71 (26 Nov 2018 07:59) (71 - 71)  BP: 112/62 (26 Nov 2018 07:59) (112/62 - 112/62)  BP(mean): --  RR: 16 (26 Nov 2018 07:59) (16 - 16)  SpO2: 99% (26 Nov 2018 07:59) (99% - 99%)
Vital Signs Last 24 Hrs  T(C): 36.9 (23 Nov 2018 08:30), Max: 36.9 (23 Nov 2018 08:30)  T(F): 98.4 (23 Nov 2018 08:30), Max: 98.4 (23 Nov 2018 08:30)  HR: 84 (23 Nov 2018 08:30) (84 - 84)  BP: 116/80 (23 Nov 2018 08:30) (116/80 - 116/80)  BP(mean): --  RR: 14 (23 Nov 2018 08:30) (14 - 14)  SpO2: 100% (23 Nov 2018 08:30) (100% - 100%)
ICU Vital Signs Last 24 Hrs  T(C): 36.7 (21 Nov 2018 07:44), Max: 36.8 (20 Nov 2018 19:45)  T(F): 98.1 (21 Nov 2018 07:44), Max: 98.2 (20 Nov 2018 19:45)  HR: 79 (21 Nov 2018 07:44) (79 - 87)  BP: 112/78 (21 Nov 2018 07:44) (112/78 - 116/71)  BP(mean): --  ABP: --  ABP(mean): --  RR: 16 (21 Nov 2018 07:44) (16 - 16)  SpO2: 100% (21 Nov 2018 07:44) (100% - 100%)
Vital Signs Last 24 Hrs  T(C): 36.9 (19 Nov 2018 17:58), Max: 37.2 (19 Nov 2018 15:38)  T(F): 98.4 (19 Nov 2018 17:58), Max: 98.9 (19 Nov 2018 15:38)  HR: 100 (19 Nov 2018 15:38) (87 - 120)  BP: 113/71 (19 Nov 2018 15:38) (113/71 - 156/85)  BP(mean): --  RR: 16 (19 Nov 2018 17:58) (16 - 18)  SpO2: 99% (19 Nov 2018 17:58) (99% - 100%)

## 2018-11-27 NOTE — PROGRESS NOTE BEHAVIORAL HEALTH - OTHER
worried about brother who may be deported
appropriately reactive; smiling
appropriately reactive; smiling
worried about brother who may be deported

## 2018-11-27 NOTE — PROGRESS NOTE BEHAVIORAL HEALTH - PRIMARY DX
Suicidal risk
Depressive disorder

## 2018-11-27 NOTE — PROGRESS NOTE BEHAVIORAL HEALTH - BODY HABITUS
Overweight/Average build
Overweight
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
## 2018-11-27 NOTE — PROGRESS NOTE BEHAVIORAL HEALTH - NSBHADMITDANGERSELF_PSY_A_CORE
suicidal ideation with plan and means
suicidal behavior
suicidal behavior
suicidal ideation with plan and means
prior to admission/suicidal ideation with plan and means
suicidal ideation with plan and means/prior to admission

## 2018-11-27 NOTE — CHART NOTE - NSCHARTNOTEFT_GEN_A_CORE
Met with patient to discuss discharge plan.  Pt willing to attend out patient treatment.  This writer will send referral to [employer] who will work with patient on a sliding scale fee following the first two appointments.  Pt accepted this.  Also called Pharmacy to ask for one week of meds to help patient as she does not have insurance to cover meds or treatment.  Called patient's employer to discuss discharge plan.  He will assist patient payment and provide proof of income for the sliding scale.  Pt will be picked up by her father tomorrow. She denies any thoughts to harm herself or others.

## 2018-11-27 NOTE — PROGRESS NOTE BEHAVIORAL HEALTH - RISK ASSESSMENT
Pt is s/p SA , at risk to harm self. She is impulsive, irritable, using EtOH, por coping skills, SI. impulsive. She is future oriented, but we have no collaterals, and pt is not cooperating.
Patient has risk factors including alcohol use, depression, suicide attempt, impulsivity, psychosocial and financial stressors, however has overweighing protective factors including no suicidal ideation, no prior suicide attempt of high lethality, fair insight and judgment, motivated for future, motivated for out-patient treatment, engaged in safety planning.
Patient has risk factors including alcohol use, depression, suicide attempt, impulsivity, psychosocial and financial stressors, however has overweighing protective factors including no suicidal ideation, no prior suicide attempt of high lethality, fair insight and judgment, motivated for future, motivated for out-patient treatment, engaged in safety planning.
Pt is s/p SA , at risk to harm self. She is impulsive, irritable, using EtOH, por coping skills, SI. impulsive. She is future oriented, but we have no collaterals, and pt is not cooperating.  Need to obtain collaterals for d/c planning

## 2018-11-27 NOTE — PROGRESS NOTE BEHAVIORAL HEALTH - NSBHPTASSESSDT_PSY_A_CORE
19-Nov-2018 19:20
20-Nov-2018 12:06
23-Nov-2018
26-Nov-2018 11:11
21-Nov-2018 11:47
24-Nov-2018 15:28
27-Nov-2018 12:29
22-Nov-2018 11:20

## 2018-11-27 NOTE — PROGRESS NOTE BEHAVIORAL HEALTH - NSBHFUPINTERVALHXFT_PSY_A_CORE
Patient seen by Dr. Dietz and this writer in private at bedside. Patient states mood is currently 10/10, with 10 indicating best mood possible. She is calm, cooperative, pleasant, smiles appropriately during the interaction. Patient reports mood is significantly better, which she attributes to her prescribed medications, hospital treatment and communication with her family. Patient. reports feeling hopefully for the future with plans to interact more with family and [facility] when discharged, states she feels ready for d/c tomorrow, excited to be discharged.  Patient denying depressed mood; denying hopelessness. Patient has no manic / psychotic symptoms. Denies desire for alcohol. Patient acknowledges current stressors; legal issues (illegal immigrant) and pt's brother currently in alf with likelihood of deportation. Patient denies suicidal ideation, patient reports remorse and regret over prior suicidal ideation and suicidal behaviors leading to in-patient hospitalization. She reports additional barriers to suicide (besides it being "wrong") being family (wants to develop stronger relationship bonds) and future (wants improved social life; wants to increase her education; "wants to live more"). Patient has been medication compliant and is motivated for out-patient continued treatment, however pt. currently w/o health insurance at this time, however likely patients employer will assist financially for pts medical needs. Patient feels safe being discharged once out-patient treatment plan is finalized. Patient currently denies any thoughts of suicidal ideations/plan/intent and currently denies any thoughts of homicidal ideation/plan/intent.
Covering MD Note ( 11/23/180  Pt seen alone and with hospital staff after pt and her father had a tearful reunion earlier after the pt's family had filed a missing persons report on the pt's behalf. The pt had not been aware of the phone system dial out system and thus the pt had been unable to call her family until recently. The pt and her father were both relieved after this reunion and the pt met briefly with detectives  who had been called as above when the pt was mistakenly believed to be 'missing'.     The pt reported improving sleep since addition of low dose Trazodone , good appetite and much improved mood. The pt reported no further SI  and continued to deny HI /AH /VH. The pt is tolerating her current med regimen of Lexapro with no reported side effects and with clinical effect still too early to fully gauge.      The pt is attending therapy with ongoing staff support and encouragement. The pt is becoming more future oriented  and her judgment continues to slowly improve.
Patient presenting calm, cooperative, pleasant, euthymic, smiling appropriately during entire interview, reports significant improvement in mood and her life's perspective. Reports mood being improved by being in hospital, treatment, and communication with her family. Patient denying depressed mood; denying hopelessness. Patient has no manic / psychotic symptoms. Denies craving alcohol. Patient continues to acknowledge her legal issues (illegal immigrant) and her brother being in [facility] with high potential of being deported as the main stressors. Patient denies suicidal ideation, reporting remorse over last suicidal ideation and suicidal behaviors leading to in-patient hospitalization, stating "what I did is wrong." Patient reporting additional barrier to suicide (besides it being "wrong") being family (wants to develop stronger relationship bonds) and future (wants improved social life; wants to increase her education; "wants to live more"). Patient has been medication compliant and is motivated for out-patient, however has no insurance at this time, however likely her employer will be able to pay for her medical needs. Patient feels safe being discharged once out-patient service is finalized.
Pt reports she is feeling "good" and rates he mood as 10 on scale of 1-10 10 being the best.  Pt denies SIIP and claims she acted on day of admission to hurt herself secondary to intoxication.  She denies using Alcohol on regular basis, or being a safety risk in context of self harm.  Pt became tearful when confided her personal worry, about her brother who is in [facility] in [place] and may be departed back to [facility].  Pt reports she is working and is planning to return to former address where she lives with friends.  Pt has father in [place] and is agreeable to have him contacted re discharge planning.  Pt denies fluctuations of mood throughout the day and attributes improvement to social support on unit and with staff.  Denies psychotic symptoms of luis m and none present on interview.
Pt interviewed in private area via translation phone #[phone].  Pt repots her father lives in [place], but barely sees him.  She gives collateral as Tu Lawson, and phone # in her contacts in her phone, which are now locked.  Pt reports she does drink to much but knows she must stop and claims she tried to hurt herself when drunk.  Pt became tearful when discussing her brother who is in a [place] residential, for driving without a license and is facing deportation.  She is afraid for his life which has been threatened by gangs in [place].  SW, Coni,  continued with interview for further assessment of social needs and discharge planning.  Pt denies SI/HI/AH/delusions.  She reports she slept well and concentration is good.  She reports support from peers and staff has made her feel safe and has improved her mood.  c/o increased anxiety today with increase of Lexapro, which will continue to monitor.  Discharge planning in progress.
Covering note   Pt with good eye contact Alert Pt denies any suicidal ideation or plan .  Pt with denial of any overwhelming anxiety or depression . Pt with no delusions elicited.  Pt is calm and not in distress.
Some difficulty sleeping last night and some anxiety but overall is doing better      MEDICATIONS  (STANDING):  escitalopram 10 milliGRAM(s) Oral daily    MEDICATIONS  (PRN):  diphenhydrAMINE   Injectable 50 milliGRAM(s) IntraMuscular every 6 hours PRN Agitation  haloperidol    Injectable 5 milliGRAM(s) IntraMuscular every 6 hours PRN Agitation  LORazepam   Injectable 2 milliGRAM(s) IntraMuscular every 4 hours PRN Agitation
Pt seen briefly . Pt still with depressed mood .  Pt currently denying any suicidal plan, and is able to indicate to staff if any resurgence of symptoms.  Pt resting in her room .

## 2018-11-27 NOTE — PROGRESS NOTE BEHAVIORAL HEALTH - NSBHCHARTREVIEWLAB_PSY_A_CORE FT
Spoke with patient and relayed Dr. Christina Moore message. She verbalized understanding of information/instructions and agreement with plan.
MEDICATIONS  (STANDING):  escitalopram 10 milliGRAM(s) Oral daily  traZODone 50 milliGRAM(s) Oral at bedtime    MEDICATIONS  (PRN):  diphenhydrAMINE   Injectable 50 milliGRAM(s) IntraMuscular every 6 hours PRN Agitation  haloperidol    Injectable 5 milliGRAM(s) IntraMuscular every 6 hours PRN Agitation  LORazepam   Injectable 2 milliGRAM(s) IntraMuscular every 4 hours PRN Agitation
Urinalysis (11.25.18 @ 11:15)    pH Urine: 7.0    Glucose Qualitative, Urine: Negative mg/dL    Blood, Urine: Negative    Color: Yellow    Urine Appearance: Clear    Bilirubin: Negative    Ketone - Urine: Negative    Specific Gravity: 1.005    Protein, Urine: Negative mg/dL    Urobilinogen: Negative mg/dL    Nitrite: Negative    Leukocyte Esterase Concentration: Negative  Phosphorus Level, Serum (11.20.18 @ 07:28)    Phosphorus Level, Serum: 3.4 mg/dL  Magnesium, Serum (11.20.18 @ 07:28)    Magnesium, Serum: 2.2 mg/dL  Basic Metabolic Panel (11.20.18 @ 07:28)    Sodium, Serum: 141 mmol/L    Potassium, Serum: 3.8 mmol/L    Chloride, Serum: 104 mmol/L    Carbon Dioxide, Serum: 29 mmol/L    Anion Gap, Serum: 8 mmol/L    Blood Urea Nitrogen, Serum: 15 mg/dL    Creatinine, Serum: 0.86 mg/dL    Glucose, Serum: 89 mg/dL    Calcium, Total Serum: 9.2 mg/dL
Hemoglobin A1C, Whole Blood (11.19.18 @ 13:17)    Hemoglobin A1C, Whole Blood: 5.7: Method: Immunoassay       Reference Range                4.0-5.6%       High risk (prediabetic)        5.7-6.4%       Diabetic, diagnostic             >=6.5%       ADA diabetic treatment goal       <7.0%  The Hemoglobin A1c testing is NGSP- certified.Reference ranges are based  upon the 2010 recommendations of  the American Diabetes Association.  Interpretation may vary for children  and adolescents. %
MEDICATIONS  (STANDING):  escitalopram 10 milliGRAM(s) Oral daily  traZODone 50 milliGRAM(s) Oral at bedtime    MEDICATIONS  (PRN):  diphenhydrAMINE   Injectable 50 milliGRAM(s) IntraMuscular every 6 hours PRN Agitation  haloperidol    Injectable 5 milliGRAM(s) IntraMuscular every 6 hours PRN Agitation  LORazepam   Injectable 2 milliGRAM(s) IntraMuscular every 4 hours PRN Agitation
14.3   7.00  )-----------( 360      ( 19 Nov 2018 02:10 )             43.4     CBC Full  -  ( 19 Nov 2018 02:10 )  WBC Count : 7.00 K/uL  Hemoglobin : 14.3 g/dL  Hematocrit : 43.4 %  Platelet Count - Automated : 360 K/uL  Mean Cell Volume : 90.6 fl  Mean Cell Hemoglobin : 29.9 pg  Mean Cell Hemoglobin Concentration : 32.9 gm/dL  Auto Neutrophil # : 3.15 K/uL  Auto Lymphocyte # : 3.27 K/uL  Auto Monocyte # : 0.39 K/uL  Auto Eosinophil # : 0.15 K/uL  Auto Basophil # : 0.02 K/uL  Auto Neutrophil % : 45.0 %  Auto Lymphocyte % : 46.7 %  Auto Monocyte % : 5.6 %  Auto Eosinophil % : 2.1 %  Auto Basophil % : 0.3 %

## 2018-11-27 NOTE — PROGRESS NOTE BEHAVIORAL HEALTH - NSBHADMITIPOBSFT_PSY_A_CORE
no acute safety concerns
no acute safety concerns
pt with depression but now with some passive suicide ideation but denies any plan
na
na
pt with depression denies SIIP
no acute safety concerns
no acute safety concerns

## 2018-11-27 NOTE — PROGRESS NOTE BEHAVIORAL HEALTH - NSBHATTESTSEENBY_PSY_A_CORE
NP with telephonic supervision from Attending Psychiatrist
attending Psychiatrist without NP/Trainee
NP with telephonic supervision from Attending Psychiatrist
attending Psychiatrist without NP/Trainee
NP with telephonic supervision from Attending Psychiatrist
Attending Psychiatrist supervising NP/Trainee, meeting pt...
attending Psychiatrist without NP/Trainee
attending Psychiatrist without NP/Trainee

## 2018-11-27 NOTE — PROGRESS NOTE BEHAVIORAL HEALTH - NSBHFUPINTERVALCCFT_PSY_A_CORE
" I'm feeling better than when I first came here."
"I'm good....I am sad because of my brother.  He may be deported.  He is in nursing home".
Im feeling really good
"I am good.  I am not suicidal.  I want to leave.  I am afraid I will lose my job."
"I doing better"
"After taking my medications, my mood is a 10"
patient reports she is feeling better  Not having to hurt self at present  Plans to return home to  whp is also her boss and whom she lives with
"I am hopeful that I will get the help I need"

## 2018-11-27 NOTE — PROGRESS NOTE BEHAVIORAL HEALTH - NSBHLEGALSTATUS_PSY_A_CORE
9.39 (Emergency)
9.13 (Voluntary)
9.13 (Voluntary)
9.39 (Emergency)
9.39 (Emergency)/s/p suicide gesture
9.39 (Emergency)/s/p suicide gesture

## 2018-11-27 NOTE — PROGRESS NOTE BEHAVIORAL HEALTH - PROBLEM SELECTOR PROBLEM 1
Depressive disorder

## 2018-11-27 NOTE — PROGRESS NOTE BEHAVIORAL HEALTH - THOUGHT PROCESS
Linear
Normal reasoning/Linear
Linear/Normal reasoning
Linear
Normal reasoning/Linear
Linear/Normal reasoning
Normal reasoning/Linear
Normal reasoning/Linear

## 2018-11-28 VITALS
SYSTOLIC BLOOD PRESSURE: 104 MMHG | OXYGEN SATURATION: 100 % | DIASTOLIC BLOOD PRESSURE: 67 MMHG | TEMPERATURE: 98 F | HEART RATE: 81 BPM | RESPIRATION RATE: 16 BRPM

## 2018-11-28 PROCEDURE — 99239 HOSP IP/OBS DSCHRG MGMT >30: CPT

## 2018-11-28 RX ORDER — ESCITALOPRAM OXALATE 10 MG/1
1 TABLET, FILM COATED ORAL
Qty: 15 | Refills: 0 | OUTPATIENT
Start: 2018-11-28 | End: 2018-12-12

## 2018-11-28 RX ORDER — TRAZODONE HCL 50 MG
1 TABLET ORAL
Qty: 21 | Refills: 1 | OUTPATIENT
Start: 2018-11-28 | End: 2019-01-08

## 2018-11-28 RX ORDER — ESCITALOPRAM OXALATE 10 MG/1
1 TABLET, FILM COATED ORAL
Qty: 21 | Refills: 0 | OUTPATIENT
Start: 2018-11-28 | End: 2018-12-18

## 2018-11-28 RX ADMIN — ESCITALOPRAM OXALATE 10 MILLIGRAM(S): 10 TABLET, FILM COATED ORAL at 09:33

## 2018-11-28 NOTE — CHART NOTE - NSCHARTNOTEFT_GEN_A_CORE
Patient alert, oriented x3, pleasant and cooperative.  Pt denies S/H IIP currently.  Nurse and  reviewed discharge plan with patient who agrees and accepts plan.  Pt provided with a copy of discharge paperwork.  Pt appropriately dressed for discharge and is transported home by family to ensure safe arrival home.  Discharge summary faxed to  [facility].

## 2018-11-30 DIAGNOSIS — R45.851 SUICIDAL IDEATIONS: ICD-10-CM

## 2018-11-30 DIAGNOSIS — F43.23 ADJUSTMENT DISORDER WITH MIXED ANXIETY AND DEPRESSED MOOD: ICD-10-CM
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
## 2018-11-30 DIAGNOSIS — F32.9 MAJOR DEPRESSIVE DISORDER, SINGLE EPISODE, UNSPECIFIED: ICD-10-CM

## 2018-11-30 DIAGNOSIS — F10.129 ALCOHOL ABUSE WITH INTOXICATION, UNSPECIFIED: ICD-10-CM

## 2018-11-30 DIAGNOSIS — F17.210 NICOTINE DEPENDENCE, CIGARETTES, UNCOMPLICATED: ICD-10-CM

## 2018-12-04 NOTE — CHART NOTE - NSCHARTNOTEFT_GEN_A_CORE
At discharge patient prescribed Lexapro 10 mg PO daily and traZODone 50 mg PO HS Seven day supply of both medications provided for patient by  pharmacy at discharge on 11/28/2018. This writer was informed today by Dr. Dietz, that patients Lexapro 10 mg was not received by [facility] #[phone] @ [address] [place] [address]. No issues reported with transmission of RX for Trazodone 50 mg PO HS. Contacted [facility]'s pharmacy, spoke with Jeannette Christian, who accepted a verbal order by this writer, copy of RX faxed to [facility] to ensure no further issues. LM for patient regarding resolved medication issue.

## 2021-07-24 NOTE — ED BEHAVIORAL HEALTH ASSESSMENT NOTE - OTHER PAST PSYCHIATRIC HISTORY (INCLUDE DETAILS REGARDING ONSET, COURSE OF ILLNESS, INPATIENT/OUTPATIENT TREATMENT)
Alert and oriented to person, place, time/situation. normal mood and affect. no apparent risk to self or others.
unknown